# Patient Record
Sex: MALE | Race: WHITE | NOT HISPANIC OR LATINO | URBAN - METROPOLITAN AREA
[De-identification: names, ages, dates, MRNs, and addresses within clinical notes are randomized per-mention and may not be internally consistent; named-entity substitution may affect disease eponyms.]

---

## 2018-01-01 ENCOUNTER — OUTPATIENT (OUTPATIENT)
Dept: OUTPATIENT SERVICES | Facility: HOSPITAL | Age: 83
LOS: 1 days | Discharge: HOME | End: 2018-01-01
Payer: MEDICARE

## 2018-01-01 ENCOUNTER — INPATIENT (INPATIENT)
Facility: HOSPITAL | Age: 83
LOS: 0 days | End: 2018-10-08
Attending: INTERNAL MEDICINE | Admitting: INTERNAL MEDICINE
Payer: MEDICARE

## 2018-01-01 VITALS
HEART RATE: 67 BPM | OXYGEN SATURATION: 100 % | RESPIRATION RATE: 16 BRPM | DIASTOLIC BLOOD PRESSURE: 70 MMHG | TEMPERATURE: 99 F | SYSTOLIC BLOOD PRESSURE: 108 MMHG | WEIGHT: 130.95 LBS | HEIGHT: 67 IN

## 2018-01-01 VITALS
DIASTOLIC BLOOD PRESSURE: 75 MMHG | WEIGHT: 130.95 LBS | OXYGEN SATURATION: 100 % | SYSTOLIC BLOOD PRESSURE: 127 MMHG | TEMPERATURE: 97 F | HEART RATE: 75 BPM | RESPIRATION RATE: 15 BRPM | HEIGHT: 68 IN

## 2018-01-01 VITALS
HEART RATE: 66 BPM | OXYGEN SATURATION: 100 % | DIASTOLIC BLOOD PRESSURE: 60 MMHG | SYSTOLIC BLOOD PRESSURE: 105 MMHG | RESPIRATION RATE: 16 BRPM | TEMPERATURE: 97 F

## 2018-01-01 VITALS — HEART RATE: 50 BPM | RESPIRATION RATE: 19 BRPM | SYSTOLIC BLOOD PRESSURE: 37 MMHG | DIASTOLIC BLOOD PRESSURE: 16 MMHG

## 2018-01-01 VITALS
RESPIRATION RATE: 16 BRPM | SYSTOLIC BLOOD PRESSURE: 104 MMHG | HEART RATE: 64 BPM | OXYGEN SATURATION: 98 % | TEMPERATURE: 98 F | DIASTOLIC BLOOD PRESSURE: 62 MMHG

## 2018-01-01 VITALS
OXYGEN SATURATION: 98 % | HEART RATE: 67 BPM | DIASTOLIC BLOOD PRESSURE: 74 MMHG | SYSTOLIC BLOOD PRESSURE: 120 MMHG | RESPIRATION RATE: 16 BRPM | HEIGHT: 68 IN | WEIGHT: 130.07 LBS | TEMPERATURE: 98 F

## 2018-01-01 VITALS — HEART RATE: 127 BPM

## 2018-01-01 DIAGNOSIS — Z88.0 ALLERGY STATUS TO PENICILLIN: ICD-10-CM

## 2018-01-01 DIAGNOSIS — C19 MALIGNANT NEOPLASM OF RECTOSIGMOID JUNCTION: ICD-10-CM

## 2018-01-01 DIAGNOSIS — Z98.890 OTHER SPECIFIED POSTPROCEDURAL STATES: Chronic | ICD-10-CM

## 2018-01-01 DIAGNOSIS — C78.7 SECONDARY MALIGNANT NEOPLASM OF LIVER AND INTRAHEPATIC BILE DUCT: ICD-10-CM

## 2018-01-01 DIAGNOSIS — H26.9 UNSPECIFIED CATARACT: ICD-10-CM

## 2018-01-01 DIAGNOSIS — Z87.891 PERSONAL HISTORY OF NICOTINE DEPENDENCE: ICD-10-CM

## 2018-01-01 DIAGNOSIS — Z66 DO NOT RESUSCITATE: ICD-10-CM

## 2018-01-01 DIAGNOSIS — I46.9 CARDIAC ARREST, CAUSE UNSPECIFIED: ICD-10-CM

## 2018-01-01 DIAGNOSIS — J43.9 EMPHYSEMA, UNSPECIFIED: ICD-10-CM

## 2018-01-01 DIAGNOSIS — J96.00 ACUTE RESPIRATORY FAILURE, UNSPECIFIED WHETHER WITH HYPOXIA OR HYPERCAPNIA: ICD-10-CM

## 2018-01-01 DIAGNOSIS — R16.0 HEPATOMEGALY, NOT ELSEWHERE CLASSIFIED: ICD-10-CM

## 2018-01-01 DIAGNOSIS — A41.9 SEPSIS, UNSPECIFIED ORGANISM: ICD-10-CM

## 2018-01-01 DIAGNOSIS — C18.9 MALIGNANT NEOPLASM OF COLON, UNSPECIFIED: ICD-10-CM

## 2018-01-01 DIAGNOSIS — Z45.2 ENCOUNTER FOR ADJUSTMENT AND MANAGEMENT OF VASCULAR ACCESS DEVICE: Chronic | ICD-10-CM

## 2018-01-01 DIAGNOSIS — K63.89 OTHER SPECIFIED DISEASES OF INTESTINE: ICD-10-CM

## 2018-01-01 DIAGNOSIS — N17.0 ACUTE KIDNEY FAILURE WITH TUBULAR NECROSIS: ICD-10-CM

## 2018-01-01 DIAGNOSIS — E78.00 PURE HYPERCHOLESTEROLEMIA, UNSPECIFIED: ICD-10-CM

## 2018-01-01 DIAGNOSIS — K63.3 ULCER OF INTESTINE: ICD-10-CM

## 2018-01-01 DIAGNOSIS — D12.8 BENIGN NEOPLASM OF RECTUM: ICD-10-CM

## 2018-01-01 DIAGNOSIS — Z85.828 PERSONAL HISTORY OF OTHER MALIGNANT NEOPLASM OF SKIN: ICD-10-CM

## 2018-01-01 DIAGNOSIS — Z92.21 PERSONAL HISTORY OF ANTINEOPLASTIC CHEMOTHERAPY: ICD-10-CM

## 2018-01-01 DIAGNOSIS — R63.4 ABNORMAL WEIGHT LOSS: ICD-10-CM

## 2018-01-01 LAB
-  K. PNEUMONIAE GROUP: SIGNIFICANT CHANGE UP
ACANTHOCYTES BLD QL SMEAR: SLIGHT — SIGNIFICANT CHANGE UP
ALBUMIN SERPL ELPH-MCNC: 1.8 G/DL — LOW (ref 3.3–5)
ALP SERPL-CCNC: 72 U/L — SIGNIFICANT CHANGE UP (ref 40–120)
ALT FLD-CCNC: 55 U/L — SIGNIFICANT CHANGE UP (ref 12–78)
ANION GAP SERPL CALC-SCNC: 22 MMOL/L — HIGH (ref 5–17)
ANION GAP SERPL CALC-SCNC: 7 MMOL/L — SIGNIFICANT CHANGE UP (ref 5–17)
ANISOCYTOSIS BLD QL: SLIGHT — SIGNIFICANT CHANGE UP
APPEARANCE UR: ABNORMAL
APPEARANCE UR: CLEAR — SIGNIFICANT CHANGE UP
APTT BLD: 30.1 SEC — SIGNIFICANT CHANGE UP (ref 27.5–37.4)
AST SERPL-CCNC: 61 U/L — HIGH (ref 15–37)
BACTERIA # UR AUTO: ABNORMAL
BASE EXCESS BLDA CALC-SCNC: -22.7 MMOL/L — LOW (ref -2–2)
BASOPHILS # BLD AUTO: 0 K/UL — SIGNIFICANT CHANGE UP (ref 0–0.2)
BASOPHILS # BLD AUTO: 0.01 K/UL — SIGNIFICANT CHANGE UP (ref 0–0.2)
BASOPHILS NFR BLD AUTO: 0 % — SIGNIFICANT CHANGE UP (ref 0–2)
BASOPHILS NFR BLD AUTO: 0.1 % — SIGNIFICANT CHANGE UP (ref 0–2)
BILIRUB SERPL-MCNC: 0.8 MG/DL — SIGNIFICANT CHANGE UP (ref 0.2–1.2)
BILIRUB UR-MCNC: ABNORMAL
BILIRUB UR-MCNC: NEGATIVE — SIGNIFICANT CHANGE UP
BUN SERPL-MCNC: 19 MG/DL — SIGNIFICANT CHANGE UP (ref 7–23)
BUN SERPL-MCNC: 39 MG/DL — HIGH (ref 7–23)
CALCIUM SERPL-MCNC: 8.1 MG/DL — LOW (ref 8.5–10.1)
CALCIUM SERPL-MCNC: 8.9 MG/DL — SIGNIFICANT CHANGE UP (ref 8.5–10.1)
CHLORIDE SERPL-SCNC: 104 MMOL/L — SIGNIFICANT CHANGE UP (ref 96–108)
CHLORIDE SERPL-SCNC: 106 MMOL/L — SIGNIFICANT CHANGE UP (ref 96–108)
CO2 SERPL-SCNC: 11 MMOL/L — LOW (ref 22–31)
CO2 SERPL-SCNC: 28 MMOL/L — SIGNIFICANT CHANGE UP (ref 22–31)
COLOR SPEC: YELLOW — SIGNIFICANT CHANGE UP
COLOR SPEC: YELLOW — SIGNIFICANT CHANGE UP
COMMENT - URINE: SIGNIFICANT CHANGE UP
CREAT SERPL-MCNC: 1.07 MG/DL — SIGNIFICANT CHANGE UP (ref 0.5–1.3)
CREAT SERPL-MCNC: 2.02 MG/DL — HIGH (ref 0.5–1.3)
DACRYOCYTES BLD QL SMEAR: SLIGHT — SIGNIFICANT CHANGE UP
DIFF PNL FLD: ABNORMAL
DIFF PNL FLD: NEGATIVE — SIGNIFICANT CHANGE UP
E COLI DNA BLD POS QL NAA+NON-PROBE: SIGNIFICANT CHANGE UP
ELLIPTOCYTES BLD QL SMEAR: SLIGHT — SIGNIFICANT CHANGE UP
EOSINOPHIL # BLD AUTO: 0.01 K/UL — SIGNIFICANT CHANGE UP (ref 0–0.5)
EOSINOPHIL # BLD AUTO: 0.04 K/UL — SIGNIFICANT CHANGE UP (ref 0–0.5)
EOSINOPHIL NFR BLD AUTO: 0.1 % — SIGNIFICANT CHANGE UP (ref 0–6)
EOSINOPHIL NFR BLD AUTO: 1 % — SIGNIFICANT CHANGE UP (ref 0–6)
EPI CELLS # UR: SIGNIFICANT CHANGE UP
GLUCOSE SERPL-MCNC: 213 MG/DL — HIGH (ref 70–99)
GLUCOSE SERPL-MCNC: 81 MG/DL — SIGNIFICANT CHANGE UP (ref 70–99)
GLUCOSE UR QL: NEGATIVE MG/DL — SIGNIFICANT CHANGE UP
GLUCOSE UR QL: NEGATIVE MG/DL — SIGNIFICANT CHANGE UP
GRAM STN FLD: SIGNIFICANT CHANGE UP
HCO3 BLDA-SCNC: 7 MMOL/L — LOW (ref 21–29)
HCT VFR BLD CALC: 31.8 % — LOW (ref 39–50)
HCT VFR BLD CALC: 33.9 % — LOW (ref 39–50)
HGB BLD-MCNC: 10.5 G/DL — LOW (ref 13–17)
HGB BLD-MCNC: 10.8 G/DL — LOW (ref 13–17)
IMM GRANULOCYTES NFR BLD AUTO: 3.5 % — HIGH (ref 0–1.5)
INR BLD: 1.09 RATIO — SIGNIFICANT CHANGE UP (ref 0.88–1.16)
INR BLD: 1.76 RATIO — HIGH (ref 0.88–1.16)
KETONES UR-MCNC: ABNORMAL
KETONES UR-MCNC: NEGATIVE — SIGNIFICANT CHANGE UP
LEUKOCYTE ESTERASE UR-ACNC: ABNORMAL
LEUKOCYTE ESTERASE UR-ACNC: NEGATIVE — SIGNIFICANT CHANGE UP
LYMPHOCYTES # BLD AUTO: 1.33 K/UL — SIGNIFICANT CHANGE UP (ref 1–3.3)
LYMPHOCYTES # BLD AUTO: 19.9 % — SIGNIFICANT CHANGE UP (ref 13–44)
LYMPHOCYTES # BLD AUTO: 2.43 K/UL — SIGNIFICANT CHANGE UP (ref 1–3.3)
LYMPHOCYTES # BLD AUTO: 38 % — SIGNIFICANT CHANGE UP (ref 13–44)
MACROCYTES BLD QL: SLIGHT — SIGNIFICANT CHANGE UP
MANUAL SMEAR VERIFICATION: SIGNIFICANT CHANGE UP
MANUAL SMEAR VERIFICATION: SIGNIFICANT CHANGE UP
MCHC RBC-ENTMCNC: 31.8 PG — SIGNIFICANT CHANGE UP (ref 27–34)
MCHC RBC-ENTMCNC: 31.9 GM/DL — LOW (ref 32–36)
MCHC RBC-ENTMCNC: 33 GM/DL — SIGNIFICANT CHANGE UP (ref 32–36)
MCHC RBC-ENTMCNC: 33.6 PG — SIGNIFICANT CHANGE UP (ref 27–34)
MCV RBC AUTO: 105.6 FL — HIGH (ref 80–100)
MCV RBC AUTO: 96.4 FL — SIGNIFICANT CHANGE UP (ref 80–100)
METHOD TYPE: SIGNIFICANT CHANGE UP
MICROCYTES BLD QL: SLIGHT — SIGNIFICANT CHANGE UP
MONOCYTES # BLD AUTO: 0.21 K/UL — SIGNIFICANT CHANGE UP (ref 0–0.9)
MONOCYTES # BLD AUTO: 0.84 K/UL — SIGNIFICANT CHANGE UP (ref 0–0.9)
MONOCYTES NFR BLD AUTO: 6 % — SIGNIFICANT CHANGE UP (ref 2–14)
MONOCYTES NFR BLD AUTO: 6.9 % — SIGNIFICANT CHANGE UP (ref 2–14)
MRSA PCR RESULT.: SIGNIFICANT CHANGE UP
NEUTROPHILS # BLD AUTO: 1.93 K/UL — SIGNIFICANT CHANGE UP (ref 1.8–7.4)
NEUTROPHILS # BLD AUTO: 8.48 K/UL — HIGH (ref 1.8–7.4)
NEUTROPHILS NFR BLD AUTO: 55 % — SIGNIFICANT CHANGE UP (ref 43–77)
NEUTROPHILS NFR BLD AUTO: 69.5 % — SIGNIFICANT CHANGE UP (ref 43–77)
NITRITE UR-MCNC: NEGATIVE — SIGNIFICANT CHANGE UP
NITRITE UR-MCNC: POSITIVE
NRBC # BLD: 0 /100 WBCS — SIGNIFICANT CHANGE UP (ref 0–0)
NRBC # BLD: 0 /100 — SIGNIFICANT CHANGE UP (ref 0–0)
NRBC # BLD: SIGNIFICANT CHANGE UP /100 WBCS (ref 0–0)
PCO2 BLDA: 28 MMHG — LOW (ref 32–46)
PH BLDA: 7.03 — CRITICAL LOW (ref 7.35–7.45)
PH UR: 6 — SIGNIFICANT CHANGE UP (ref 5–8)
PH UR: 7 — SIGNIFICANT CHANGE UP (ref 5–8)
PLAT MORPH BLD: NORMAL — SIGNIFICANT CHANGE UP
PLAT MORPH BLD: NORMAL — SIGNIFICANT CHANGE UP
PLATELET # BLD AUTO: 181 K/UL — SIGNIFICANT CHANGE UP (ref 150–400)
PLATELET # BLD AUTO: 38 K/UL — LOW (ref 150–400)
PO2 BLDA: 118 MMHG — HIGH (ref 74–108)
POIKILOCYTOSIS BLD QL AUTO: SLIGHT — SIGNIFICANT CHANGE UP
POLYCHROMASIA BLD QL SMEAR: SLIGHT — SIGNIFICANT CHANGE UP
POTASSIUM SERPL-MCNC: 3.8 MMOL/L — SIGNIFICANT CHANGE UP (ref 3.5–5.3)
POTASSIUM SERPL-MCNC: 4.3 MMOL/L — SIGNIFICANT CHANGE UP (ref 3.5–5.3)
POTASSIUM SERPL-SCNC: 3.8 MMOL/L — SIGNIFICANT CHANGE UP (ref 3.5–5.3)
POTASSIUM SERPL-SCNC: 4.3 MMOL/L — SIGNIFICANT CHANGE UP (ref 3.5–5.3)
PROT SERPL-MCNC: 4.4 GM/DL — LOW (ref 6–8.3)
PROT UR-MCNC: 100 MG/DL
PROT UR-MCNC: NEGATIVE MG/DL — SIGNIFICANT CHANGE UP
PROTHROM AB SERPL-ACNC: 11.8 SEC — SIGNIFICANT CHANGE UP (ref 9.8–12.7)
PROTHROM AB SERPL-ACNC: 19.2 SEC — HIGH (ref 9.8–12.7)
RBC # BLD: 3.21 M/UL — LOW (ref 4.2–5.8)
RBC # BLD: 3.3 M/UL — LOW (ref 4.2–5.8)
RBC # FLD: 15.7 % — HIGH (ref 10.3–14.5)
RBC # FLD: 20.1 % — HIGH (ref 10.3–14.5)
RBC BLD AUTO: ABNORMAL
RBC BLD AUTO: NORMAL — SIGNIFICANT CHANGE UP
RBC CASTS # UR COMP ASSIST: >50 /HPF (ref 0–4)
S AUREUS DNA NOSE QL NAA+PROBE: SIGNIFICANT CHANGE UP
SAO2 % BLDA: 95 % — SIGNIFICANT CHANGE UP (ref 92–96)
SODIUM SERPL-SCNC: 139 MMOL/L — SIGNIFICANT CHANGE UP (ref 135–145)
SODIUM SERPL-SCNC: 139 MMOL/L — SIGNIFICANT CHANGE UP (ref 135–145)
SP GR SPEC: 1 — LOW (ref 1.01–1.02)
SP GR SPEC: 1.02 — SIGNIFICANT CHANGE UP (ref 1.01–1.02)
SPECIMEN SOURCE: SIGNIFICANT CHANGE UP
SURGICAL PATHOLOGY FINAL REPORT - CH: SIGNIFICANT CHANGE UP
SURGICAL PATHOLOGY FINAL REPORT - CH: SIGNIFICANT CHANGE UP
TARGETS BLD QL SMEAR: SLIGHT — SIGNIFICANT CHANGE UP
TROPONIN I SERPL-MCNC: 0.06 NG/ML — HIGH (ref 0.01–0.04)
UROBILINOGEN FLD QL: 1 MG/DL
UROBILINOGEN FLD QL: NEGATIVE MG/DL — SIGNIFICANT CHANGE UP
WBC # BLD: 12.2 K/UL — HIGH (ref 3.8–10.5)
WBC # BLD: 3.5 K/UL — LOW (ref 3.8–10.5)
WBC # FLD AUTO: 12.2 K/UL — HIGH (ref 3.8–10.5)
WBC # FLD AUTO: 3.5 K/UL — LOW (ref 3.8–10.5)
WBC UR QL: ABNORMAL

## 2018-01-01 PROCEDURE — 88172 CYTP DX EVAL FNA 1ST EA SITE: CPT | Mod: 26

## 2018-01-01 PROCEDURE — 88305 TISSUE EXAM BY PATHOLOGIST: CPT | Mod: 26

## 2018-01-01 PROCEDURE — 93010 ELECTROCARDIOGRAM REPORT: CPT

## 2018-01-01 PROCEDURE — 88341 IMHCHEM/IMCYTCHM EA ADD ANTB: CPT | Mod: 26

## 2018-01-01 PROCEDURE — 88307 TISSUE EXAM BY PATHOLOGIST: CPT | Mod: 26

## 2018-01-01 PROCEDURE — 99291 CRITICAL CARE FIRST HOUR: CPT

## 2018-01-01 PROCEDURE — 88173 CYTOPATH EVAL FNA REPORT: CPT | Mod: 26

## 2018-01-01 PROCEDURE — 74018 RADEX ABDOMEN 1 VIEW: CPT | Mod: 26

## 2018-01-01 PROCEDURE — 47000 NEEDLE BIOPSY OF LIVER PERQ: CPT

## 2018-01-01 PROCEDURE — 71045 X-RAY EXAM CHEST 1 VIEW: CPT | Mod: 26

## 2018-01-01 PROCEDURE — 88342 IMHCHEM/IMCYTCHM 1ST ANTB: CPT | Mod: 26

## 2018-01-01 PROCEDURE — 76942 ECHO GUIDE FOR BIOPSY: CPT | Mod: 26

## 2018-01-01 RX ORDER — CEFTRIAXONE 500 MG/1
1000 INJECTION, POWDER, FOR SOLUTION INTRAMUSCULAR; INTRAVENOUS EVERY 24 HOURS
Qty: 0 | Refills: 0 | Status: DISCONTINUED | OUTPATIENT
Start: 2018-01-01 | End: 2018-01-01

## 2018-01-01 RX ORDER — NOREPINEPHRINE BITARTRATE/D5W 8 MG/250ML
0.05 PLASTIC BAG, INJECTION (ML) INTRAVENOUS
Qty: 8 | Refills: 0 | Status: DISCONTINUED | OUTPATIENT
Start: 2018-01-01 | End: 2018-01-01

## 2018-01-01 RX ORDER — ACETAMINOPHEN 500 MG
1000 TABLET ORAL ONCE
Qty: 0 | Refills: 0 | Status: COMPLETED | OUTPATIENT
Start: 2018-01-01 | End: 2018-01-01

## 2018-01-01 RX ORDER — OXYCODONE HYDROCHLORIDE 5 MG/1
5 TABLET ORAL ONCE
Qty: 0 | Refills: 0 | Status: DISCONTINUED | OUTPATIENT
Start: 2018-01-01 | End: 2018-01-01

## 2018-01-01 RX ORDER — VASOPRESSIN 20 [USP'U]/ML
0.04 INJECTION INTRAVENOUS
Qty: 100 | Refills: 0 | Status: DISCONTINUED | OUTPATIENT
Start: 2018-01-01 | End: 2018-01-01

## 2018-01-01 RX ORDER — CHLORHEXIDINE GLUCONATE 213 G/1000ML
15 SOLUTION TOPICAL
Qty: 0 | Refills: 0 | Status: DISCONTINUED | OUTPATIENT
Start: 2018-01-01 | End: 2018-01-01

## 2018-01-01 RX ORDER — FENTANYL CITRATE 50 UG/ML
25 INJECTION INTRAVENOUS
Qty: 0 | Refills: 0 | Status: DISCONTINUED | OUTPATIENT
Start: 2018-01-01 | End: 2018-01-01

## 2018-01-01 RX ORDER — ACETAMINOPHEN 500 MG
650 TABLET ORAL EVERY 6 HOURS
Qty: 0 | Refills: 0 | Status: DISCONTINUED | OUTPATIENT
Start: 2018-01-01 | End: 2018-01-01

## 2018-01-01 RX ORDER — SODIUM CHLORIDE 9 MG/ML
1000 INJECTION, SOLUTION INTRAVENOUS
Qty: 0 | Refills: 0 | Status: DISCONTINUED | OUTPATIENT
Start: 2018-01-01 | End: 2018-01-01

## 2018-01-01 RX ORDER — SODIUM CHLORIDE 9 MG/ML
1000 INJECTION INTRAMUSCULAR; INTRAVENOUS; SUBCUTANEOUS
Qty: 0 | Refills: 0 | Status: DISCONTINUED | OUTPATIENT
Start: 2018-01-01 | End: 2018-01-01

## 2018-01-01 RX ORDER — ONDANSETRON 8 MG/1
4 TABLET, FILM COATED ORAL ONCE
Qty: 0 | Refills: 0 | Status: DISCONTINUED | OUTPATIENT
Start: 2018-01-01 | End: 2018-01-01

## 2018-01-01 RX ORDER — OXYCODONE AND ACETAMINOPHEN 5; 325 MG/1; MG/1
1 TABLET ORAL ONCE
Qty: 0 | Refills: 0 | Status: DISCONTINUED | OUTPATIENT
Start: 2018-01-01 | End: 2018-01-01

## 2018-01-01 RX ORDER — FENTANYL CITRATE 50 UG/ML
0.5 INJECTION INTRAVENOUS
Qty: 2500 | Refills: 0 | Status: DISCONTINUED | OUTPATIENT
Start: 2018-01-01 | End: 2018-01-01

## 2018-01-01 RX ORDER — BIMATOPROST 0.3 MG/ML
1 SOLUTION/ DROPS OPHTHALMIC
Qty: 0 | Refills: 0 | COMMUNITY

## 2018-01-01 RX ORDER — VANCOMYCIN HCL 1 G
1000 VIAL (EA) INTRAVENOUS ONCE
Qty: 0 | Refills: 0 | Status: COMPLETED | OUTPATIENT
Start: 2018-01-01 | End: 2018-01-01

## 2018-01-01 RX ORDER — MEPERIDINE HYDROCHLORIDE 50 MG/ML
12.5 INJECTION INTRAMUSCULAR; INTRAVENOUS; SUBCUTANEOUS
Qty: 0 | Refills: 0 | Status: DISCONTINUED | OUTPATIENT
Start: 2018-01-01 | End: 2018-01-01

## 2018-01-01 RX ADMIN — Medication 5.61 MICROGRAM(S)/KG/MIN: at 12:09

## 2018-01-01 RX ADMIN — SODIUM CHLORIDE 100 MILLILITER(S): 9 INJECTION, SOLUTION INTRAVENOUS at 09:56

## 2018-01-01 RX ADMIN — FENTANYL CITRATE 2.99 MICROGRAM(S)/KG/HR: 50 INJECTION INTRAVENOUS at 10:54

## 2018-01-01 RX ADMIN — Medication 400 MILLIGRAM(S): at 10:48

## 2018-07-19 PROBLEM — L30.9 DERMATITIS, UNSPECIFIED: Chronic | Status: ACTIVE | Noted: 2018-01-01

## 2018-07-19 PROBLEM — H26.9 UNSPECIFIED CATARACT: Chronic | Status: ACTIVE | Noted: 2018-01-01

## 2018-07-20 NOTE — CHART NOTE - NSCHARTNOTEFT_GEN_A_CORE
Vital Sign  Pulse 59  Respiration 18  Temperature 97.9  SpO2 100%  B/P122/67    Plan   1. NPO after midnight  2. Use E-Z sponge as directed  3. Use Mupirocin as directed.  4. Drink a quart of extra  fluids the day before your surgery.  5. Medical clearance with Dr. Unger  6. CBC, BMP, Urinalysis, sent to lab  7. EKG done

## 2018-07-20 NOTE — ASU PATIENT PROFILE, ADULT - PMH
Basal cell carcinoma (BCC) in situ of skin  removed  Cataract    Diarrhea, unspecified type    Eczema    Eye pressure  elevated  Malignant neoplasm of colon, unspecified part of colon    Weight loss

## 2018-07-21 PROBLEM — D04.9 CARCINOMA IN SITU OF SKIN, UNSPECIFIED: Chronic | Status: ACTIVE | Noted: 2018-01-01

## 2018-07-30 NOTE — BRIEF OPERATIVE NOTE - PROCEDURE
<<-----Click on this checkbox to enter Procedure Venous access port placement  07/30/2018    Active  [email or web address]

## 2018-07-30 NOTE — ASU DISCHARGE PLAN (ADULT/PEDIATRIC). - MEDICATION SUMMARY - MEDICATIONS TO TAKE
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

## 2018-08-01 PROBLEM — R63.4 ABNORMAL WEIGHT LOSS: Chronic | Status: ACTIVE | Noted: 2018-01-01

## 2018-08-01 PROBLEM — C18.9 MALIGNANT NEOPLASM OF COLON, UNSPECIFIED: Chronic | Status: ACTIVE | Noted: 2018-01-01

## 2018-08-01 PROBLEM — R19.7 DIARRHEA, UNSPECIFIED: Chronic | Status: ACTIVE | Noted: 2018-01-01

## 2018-08-01 PROBLEM — H57.9 UNSPECIFIED DISORDER OF EYE AND ADNEXA: Chronic | Status: ACTIVE | Noted: 2018-01-01

## 2018-08-03 NOTE — ASU DISCHARGE PLAN (ADULT/PEDIATRIC). - NURSING INSTRUCTIONS
For any problems or concerns, contact your doctor.  If you cannot reach the doctor, call [facility] Emergency Department at [phone] or go to your local Emergency Department.    A responsible adult should be with you for the rest of the day and night for your safety and to help you if you needed. Resume your medications as listed on the attached Medication Record.    Use over the counter stool softener to relieve any constipation you may experience from the pain medication. Increase clear fluids for the next few days. Ambulation is a key part of recovery, so keep moving!

## 2018-08-03 NOTE — ASU DISCHARGE PLAN (ADULT/PEDIATRIC). - NOTIFY
Bleeding that does not stop/Swelling that continues/Persistent Nausea and Vomiting/Increased Irritability or Sluggishness

## 2018-08-03 NOTE — ASU PATIENT PROFILE, ADULT - PSH
Encounter for insertion of venous access port    H/O hernia repair    S/P colonoscopy  last done 7/16/2018

## 2018-08-03 NOTE — BRIEF OPERATIVE NOTE - OPERATION/FINDINGS
1) Liver mass under UR, R lobe  2) Access under US with 17G needle  3) 18G core x 3  4) No immediate post procedure hematoma following gelfoam tract embolization

## 2018-10-08 NOTE — H&P ADULT - NSHPLABSRESULTS_GEN_ALL_CORE
10.8   12.20 )-----------( 38       ( 08 Oct 2018 08:45 )             33.9       CBC Full  -  ( 08 Oct 2018 08:45 )  WBC Count : 12.20 K/uL  Hemoglobin : 10.8 g/dL  Hematocrit : 33.9 %  Platelet Count - Automated : 38 K/uL  Mean Cell Volume : 105.6 fl  Mean Cell Hemoglobin : 33.6 pg  Mean Cell Hemoglobin Concentration : 31.9 gm/dL  Auto Neutrophil # : 8.48 K/uL  Auto Lymphocyte # : 2.43 K/uL  Auto Monocyte # : 0.84 K/uL  Auto Eosinophil # : 0.01 K/uL  Auto Basophil # : 0.01 K/uL  Auto Neutrophil % : 69.5 %  Auto Lymphocyte % : 19.9 %  Auto Monocyte % : 6.9 %  Auto Eosinophil % : 0.1 %  Auto Basophil % : 0.1 %      10-08    139  |  106  |  39<H>  ----------------------------<  213<H>  3.8   |  11<L>  |  2.02<H>    Ca    8.1<L>      08 Oct 2018 08:45    TPro  4.4<L>  /  Alb  1.8<L>  /  TBili  0.8  /  DBili  x   /  AST  61<H>  /  ALT  55  /  AlkPhos  72  10-08      PT/INR - ( 08 Oct 2018 09:28 )   PT: 19.2 sec;   INR: 1.76 ratio         PTT - ( 08 Oct 2018 09:28 )  PTT:30.1 sec    Urinalysis Basic - ( 08 Oct 2018 09:04 )    Color: Yellow / Appearance: Slightly Turbid / S.025 / pH: x  Gluc: x / Ketone: Trace  / Bili: Moderate / Urobili: 1 mg/dL   Blood: x / Protein: 100 mg/dL / Nitrite: Positive   Leuk Esterase: Trace / RBC: >50 /HPF / WBC 6-10   Sq Epi: x / Non Sq Epi: Few / Bacteria: Moderate        LIVER FUNCTIONS - ( 08 Oct 2018 08:45 )  Alb: 1.8 g/dL / Pro: 4.4 gm/dL / ALK PHOS: 72 U/L / ALT: 55 U/L / AST: 61 U/L / GGT: x                 CARDIAC MARKERS ( 08 Oct 2018 08:45 )  0.057 ng/mL / x     / x     / x     / x

## 2018-10-08 NOTE — ED ADULT NURSE NOTE - OBJECTIVE STATEMENT
pt BIBEMS unresponsive/pulseless. EMS reports call to house for nausea, pt w/ intermittent consciousness upon EMS arrival. upon arrival to ED pt lost pulse- CPR initiated at 0819. see progress note. pt w/ colon CA, started chemotherapy 1 wk ago. pt BIBEMS unresponsive/pulseless. EMS reports call to house for nausea, pt w/ intermittent consciousness upon EMS arrival. upon arrival to ED pt lost pulse- CPR initiated at 0819. see reassessment note. pt w/ colon CA, started chemotherapy 1 wk ago.

## 2018-10-08 NOTE — H&P ADULT - ASSESSMENT
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 sepsis--acute resp failure--ALF (KDIGo 1) likely from ATN  Coagulopathy with elevated INR    Very poor prognosis on high dose pressors-not stable for any road trips  DNI/DNR    Plan:    Admit to ICU  Will start fent gtt for comfort  Maintain levo gtt but cap at 2.3 mcg/kg/min  Full vent support for now  Will change abx to zosyn for better anaerobic coverage  NPO  IVF  DVT prophy--SCD and no chemical     Above d/w ED staff, son at bedside and dr hodges    60 cc mins excluding procedure

## 2018-10-08 NOTE — ED ADULT NURSE REASSESSMENT NOTE - NS ED NURSE REASSESS COMMENT FT1
Patient was evaluated by Dr. Chery , Pt condition was critical , DNR form signed , Fentanyl drip initiated , Levophed infusing . Pt 's BP hypotensive , Hr bradycardic  which progressed to asystole at 1108 .

## 2018-10-08 NOTE — ED ADULT TRIAGE NOTE - CHIEF COMPLAINT QUOTE
Patient presents with EMS for nausea and difficulty breathing, as patient was on gurney from ambulance patient lost a pulse while ventilations were being assisted and direct bed to Trauma

## 2018-10-08 NOTE — ED PROVIDER NOTE - PROGRESS NOTE DETAILS
ROSC obtained -- NS x 2L, levophed infusing with SBP ~80-90,.  CXR w/ET tube in good position.  CCM consulted and at bedside.  Goals of care discussed with son -- would not attempt resuscitation if patient should arrest again.  Will start fentanyl infusion for comfort. Patient became bradycardic with subsequent VF -> asystole.  Resuscitation not attempted.  Family at bedside and notified.

## 2018-10-08 NOTE — ED PROVIDER NOTE - CRITICAL CARE PROVIDED
direct patient care (not related to procedure)/consult w/ pt's family directly relating to pts condition/consultation with other physicians

## 2018-10-08 NOTE — ED PROCEDURE NOTE - CPROC ED INTRAOSS CONFIRM1
needle stands without support/aspiration of blood/marrow mixture without difficulty/flushing with fluid

## 2018-10-08 NOTE — ED PROVIDER NOTE - OBJECTIVE STATEMENT
81 y/o male with PMHx of metastatic colon CA presents to the ED BIBEMS s/p respiratory arrest. According to son at bedside, for the last three days pt has been vomiting and unable to tolerate PO intake. Pt c/o SOB this morning and had a respiratory arrest in route to the hospital. CPR was initiated by EMS in route. No other hx was available due to respiratory arrest.

## 2018-10-08 NOTE — H&P ADULT - HISTORY OF PRESENT ILLNESS
Asked by dr hodges to see pt.  Pt seen and examined in ED with son at bedside    83 y/o male with metastatic colon ca to liver dx'ed in July/Aug--s/p chemoRx last week wt dr mackenzie--presented with several days of vomiting and diarrhea.  Upon arrival in ED, pt was PEA--ACLS initiated with ROSC.  Pt in profound shock, on levo gtt at 2.3 with SBP 40s.  After discussing all Rx options with son at bedside including aggressive care (CVL and additional pressors) vs comfort care/WD of care vs capping current dosage of pressors and comfort care--after detailed discussion, son has decided to proceed with capping Rx and comfort care.  He was given 4L IVF, IV CTX and vanco  On my exam, SBP 40-60s, ill, vented, cachetic awake and moving all extrem.  OGT draining dark blood.  EKG--ST no acute ST elevation.  CXR--ETT OK/R port/no infitrates    Pt is started on fent gtt and admitted to ICU.  DNI/DNR signed

## 2018-10-08 NOTE — ED PROVIDER NOTE - MEDICAL DECISION MAKING DETAILS
Plan: Resuscitate CCM consult. Discuss course of care with family. Plan: Resuscitate CCM consult. Discuss goals of care with family.

## 2018-10-08 NOTE — ED PROCEDURE NOTE - CPROC ED TRACHE INTUB DETAIL1
cardiac arrest crash intubation/Patient was pre-oxygenated. An endotracheal tube (ETT) was placed through the vocal cords into the trachea.  ETT position was confirmed by auscultation of bilateral breath sounds to all lung fields. ETCO2 level was appropriate./Difficult/crash intubation (see additional details section).

## 2018-10-08 NOTE — H&P ADULT - NSHPPHYSICALEXAM_GEN_ALL_CORE
Vital Signs Last 24 Hrs  T(C): --  T(F): --  HR: 119 (08 Oct 2018 09:17) (110 - 128)  BP: 68/47 (08 Oct 2018 09:17) (52/33 - 103/83)  BP(mean): --  RR: 28 (08 Oct 2018 09:17) (27 - 28)  SpO2: 98% (08 Oct 2018 09:17) (93% - 98%)

## 2018-10-08 NOTE — ED ADULT NURSE REASSESSMENT NOTE - NS ED NURSE REASSESS COMMENT FT1
pt noted to be in PEA at 0819 upon arrival to ED, CPR initiated. respiratory called to ED, MD Billy and MD Restrepo at bedside. pt intubated @ 0820. 20g PIV placed to R FA, 20g PIV placed to R wrist. 1 mg epinephrine IVP x2 administered at 0822 and 0825. labs drawn and sent to lab at 0828. NS bolus x2 initiated via pressure bag. BP 59/33,  at 0832. IO inserted to L tibia by MD Billy, levophed infusion initiated at 0.05 mcg/kg. bicarb administered @ 0840. son at bedside, updated on poc.

## 2018-10-09 LAB
CULTURE RESULTS: SIGNIFICANT CHANGE UP
GLUCOSE BLDC GLUCOMTR-MCNC: 220 MG/DL — HIGH (ref 70–99)
SPECIMEN SOURCE: SIGNIFICANT CHANGE UP

## 2018-10-10 LAB
-  AMIKACIN: SIGNIFICANT CHANGE UP
-  AMIKACIN: SIGNIFICANT CHANGE UP
-  AMPICILLIN/SULBACTAM: SIGNIFICANT CHANGE UP
-  AMPICILLIN/SULBACTAM: SIGNIFICANT CHANGE UP
-  AMPICILLIN: SIGNIFICANT CHANGE UP
-  AMPICILLIN: SIGNIFICANT CHANGE UP
-  AZTREONAM: SIGNIFICANT CHANGE UP
-  AZTREONAM: SIGNIFICANT CHANGE UP
-  CEFAZOLIN: SIGNIFICANT CHANGE UP
-  CEFAZOLIN: SIGNIFICANT CHANGE UP
-  CEFEPIME: SIGNIFICANT CHANGE UP
-  CEFEPIME: SIGNIFICANT CHANGE UP
-  CEFOXITIN: SIGNIFICANT CHANGE UP
-  CEFOXITIN: SIGNIFICANT CHANGE UP
-  CEFTRIAXONE: SIGNIFICANT CHANGE UP
-  CEFTRIAXONE: SIGNIFICANT CHANGE UP
-  CIPROFLOXACIN: SIGNIFICANT CHANGE UP
-  CIPROFLOXACIN: SIGNIFICANT CHANGE UP
-  ERTAPENEM: SIGNIFICANT CHANGE UP
-  ERTAPENEM: SIGNIFICANT CHANGE UP
-  GENTAMICIN: SIGNIFICANT CHANGE UP
-  GENTAMICIN: SIGNIFICANT CHANGE UP
-  IMIPENEM: SIGNIFICANT CHANGE UP
-  IMIPENEM: SIGNIFICANT CHANGE UP
-  LEVOFLOXACIN: SIGNIFICANT CHANGE UP
-  LEVOFLOXACIN: SIGNIFICANT CHANGE UP
-  MEROPENEM: SIGNIFICANT CHANGE UP
-  MEROPENEM: SIGNIFICANT CHANGE UP
-  PIPERACILLIN/TAZOBACTAM: SIGNIFICANT CHANGE UP
-  PIPERACILLIN/TAZOBACTAM: SIGNIFICANT CHANGE UP
-  TOBRAMYCIN: SIGNIFICANT CHANGE UP
-  TOBRAMYCIN: SIGNIFICANT CHANGE UP
-  TRIMETHOPRIM/SULFAMETHOXAZOLE: SIGNIFICANT CHANGE UP
-  TRIMETHOPRIM/SULFAMETHOXAZOLE: SIGNIFICANT CHANGE UP
CULTURE RESULTS: SIGNIFICANT CHANGE UP
METHOD TYPE: SIGNIFICANT CHANGE UP
METHOD TYPE: SIGNIFICANT CHANGE UP
ORGANISM # SPEC MICROSCOPIC CNT: SIGNIFICANT CHANGE UP
SPECIMEN SOURCE: SIGNIFICANT CHANGE UP

## 2022-04-27 NOTE — ASU PATIENT PROFILE, ADULT - PMH
at risk behaviors demonstrated
Basal cell carcinoma (BCC) in situ of skin  removed  Cataract    Diarrhea, unspecified type    Eczema    Eye pressure  elevated  Malignant neoplasm of colon, unspecified part of colon    Weight loss

## 2024-07-08 NOTE — ASU PATIENT PROFILE, ADULT - NSCAFFEINETYPE_GEN_ALL_CORE_SD
Detail Level: Generalized Detail Level: Detailed Patient Specific Counseling (Will Not Stick From Patient To Patient): - apply triamcinolone 0.1% cream to affected areas of arms BID for 2 weeks\\n- patient will call in 2 weeks if not resolved, or if recurrent Detail Level: Zone coffee/tea none